# Patient Record
Sex: FEMALE | Race: WHITE | NOT HISPANIC OR LATINO | URBAN - NONMETROPOLITAN AREA
[De-identification: names, ages, dates, MRNs, and addresses within clinical notes are randomized per-mention and may not be internally consistent; named-entity substitution may affect disease eponyms.]

---

## 2021-01-20 ENCOUNTER — IMMUNIZATIONS (OUTPATIENT)
Dept: FAMILY MEDICINE CLINIC | Facility: HOSPITAL | Age: 41
End: 2021-01-20

## 2021-01-20 DIAGNOSIS — Z23 ENCOUNTER FOR IMMUNIZATION: Primary | ICD-10-CM

## 2021-01-20 PROCEDURE — 0011A SARS-COV-2 / COVID-19 MRNA VACCINE (MODERNA) 100 MCG: CPT

## 2021-01-20 PROCEDURE — 91301 SARS-COV-2 / COVID-19 MRNA VACCINE (MODERNA) 100 MCG: CPT

## 2021-02-26 ENCOUNTER — IMMUNIZATIONS (OUTPATIENT)
Dept: FAMILY MEDICINE CLINIC | Facility: HOSPITAL | Age: 41
End: 2021-02-26

## 2021-02-26 DIAGNOSIS — Z23 ENCOUNTER FOR IMMUNIZATION: Primary | ICD-10-CM

## 2021-02-26 PROCEDURE — 91301 SARS-COV-2 / COVID-19 MRNA VACCINE (MODERNA) 100 MCG: CPT

## 2021-02-26 PROCEDURE — 0012A SARS-COV-2 / COVID-19 MRNA VACCINE (MODERNA) 100 MCG: CPT

## 2022-03-22 ENCOUNTER — TELEPHONE (OUTPATIENT)
Dept: OBGYN CLINIC | Facility: CLINIC | Age: 42
End: 2022-03-22

## 2022-03-22 NOTE — TELEPHONE ENCOUNTER
Left vm stating that we are out of network with [address] until 4/15/22 so her appointment is cx'd for 3/25/22 and she can call back to r/s after 4/15

## 2024-05-19 ENCOUNTER — APPOINTMENT (OUTPATIENT)
Dept: RADIOLOGY | Facility: CLINIC | Age: 44
End: 2024-05-19
Payer: COMMERCIAL

## 2024-05-19 ENCOUNTER — OFFICE VISIT (OUTPATIENT)
Dept: URGENT CARE | Facility: CLINIC | Age: 44
End: 2024-05-19
Payer: COMMERCIAL

## 2024-05-19 VITALS
OXYGEN SATURATION: 99 % | HEART RATE: 72 BPM | DIASTOLIC BLOOD PRESSURE: 78 MMHG | SYSTOLIC BLOOD PRESSURE: 132 MMHG | RESPIRATION RATE: 18 BRPM

## 2024-05-19 DIAGNOSIS — M79.631 PAIN OF RIGHT FOREARM: Primary | ICD-10-CM

## 2024-05-19 PROCEDURE — 99213 OFFICE O/P EST LOW 20 MIN: CPT

## 2024-05-19 PROCEDURE — 73090 X-RAY EXAM OF FOREARM: CPT

## 2024-05-19 NOTE — PROGRESS NOTES
[facility]        NAME: Lauren Lopez is a 43 y.o. female  : 1980    MRN: [id number]  DATE: May 19, 2024  TIME: 1:43 PM    Assessment and Plan   Pain of right forearm [M79.631]  1. Pain of right forearm  XR forearm 2 vw right    Ambulatory Referral to Orthopedic Surgery        Discussed problem with patient.  Symptoms consistent with sprain and forearm x-rays reveal no acute abnormalities but awaiting official radiology interpretation.  Placed referral for orthopedic for follow-up as symptoms have been ongoing.  Discussed dosing of Tylenol and she also be using ice.    Patient Instructions       Follow up with PCP in 3-5 days.  Proceed to  ER if symptoms worsen.    If tests are performed, our office will contact you with results only if changes need to made to the care plan discussed with you at the visit. [facility] on [facility]    Chief Complaint     Chief Complaint   Patient presents with   • Arm Pain     Patient has right arm pain that started about 3 weeks ago after her dog knocked her down the stairs. Denies head trauma and any LOC. Believes her arm got stuck in the banister on the way down, has increased pain and weakness of the right arm          History of Present Illness       Patient has right arm pain that started about 3 weeks ago after her dog knocked her down the stairs. Denies head trauma and any LOC. Believes her arm got stuck in the banister on the way down, has increased pain and weakness of the right arm. 3/10 at rest. Tylenol two pills once per day. Denies any numbness or tingling    Arm Pain   Pertinent negatives include no chest pain.       Review of Systems   Review of Systems   Constitutional:  Negative for appetite change, chills, fatigue and fever.   Respiratory:  Negative for cough, shortness of breath, wheezing and stridor.    Cardiovascular:  Negative for chest pain and palpitations.         Current Medications       Current  Outpatient Medications:   •  Melatonin 5 MG CAPS, Take 5 mg by mouth daily, Disp: , Rfl:     Current Allergies     Allergies as of 05/19/2024 - Reviewed 05/19/2024   Allergen Reaction Noted   • Sulfa antibiotics Hives 05/19/2024            The following portions of the patient's history were reviewed and updated as appropriate: allergies, current medications, past family history, past medical history, past social history, past surgical history and problem list.     No past medical history on file.    No past surgical history on file.    No family history on file.      Medications have been verified.        Objective   /78   Pulse 72   Resp 18   SpO2 99%        Physical Exam     Physical Exam  Vitals and nursing note reviewed.   Constitutional:       General: She is not in acute distress.     Appearance: Normal appearance. She is normal weight. She is not ill-appearing, toxic-appearing or diaphoretic.   Cardiovascular:      Rate and Rhythm: Normal rate and regular rhythm.      Pulses: Normal pulses.      Heart sounds: Normal heart sounds. No murmur heard.     No friction rub. No gallop.   Pulmonary:      Effort: Pulmonary effort is normal. No respiratory distress.      Breath sounds: Normal breath sounds. No stridor. No wheezing, rhonchi or rales.   Chest:      Chest wall: No tenderness.   Musculoskeletal:      Right elbow: Normal.      Right forearm: No swelling, edema, deformity, lacerations, tenderness or bony tenderness.      Right wrist: Normal.      Comments: Full range of motion with elbow but with pain with elbow flexion and extension.  Is having anterior forearm pain that extends from her elbow down into her forearm.  No overlying skin changes or deformities.  No bruising or swelling.  +2 radial pulse as well as +2 brachial pulse.  Denies any numbness or tingling in extremity.  5 out of 5  strength   Neurological:      Mental Status: She is alert.

## 2024-07-10 ENCOUNTER — OFFICE VISIT (OUTPATIENT)
Dept: OBGYN CLINIC | Facility: CLINIC | Age: 44
End: 2024-07-10
Payer: COMMERCIAL

## 2024-07-10 VITALS
SYSTOLIC BLOOD PRESSURE: 118 MMHG | HEIGHT: 61 IN | BODY MASS INDEX: 32.1 KG/M2 | WEIGHT: 170 LBS | OXYGEN SATURATION: 98 % | DIASTOLIC BLOOD PRESSURE: 78 MMHG | HEART RATE: 60 BPM | TEMPERATURE: 97.4 F

## 2024-07-10 DIAGNOSIS — G89.29 CHRONIC PAIN OF RIGHT UPPER EXTREMITY: Primary | ICD-10-CM

## 2024-07-10 DIAGNOSIS — M79.601 CHRONIC PAIN OF RIGHT UPPER EXTREMITY: Primary | ICD-10-CM

## 2024-07-10 PROCEDURE — 99204 OFFICE O/P NEW MOD 45 MIN: CPT | Performed by: ORTHOPAEDIC SURGERY

## 2024-07-10 NOTE — PROGRESS NOTES
ASSESSMENT/PLAN:    Diagnoses and all orders for this visit:    Chronic pain of right upper extremity  -     Ambulatory Referral to Physical Therapy; Future        Plan: I would recommend a trial of physical therapy and a prescription was placed in her chart.  I will see her in 3 to 4 weeks for reevaluation.  The office should be contacted if questions or concerns arise.  She did question me as to whether or not she can return to working out in the gym and I think she would be able to do so but should avoid any exercises that only aggravate her symptoms.  If symptoms or not improving with physical therapy, additional diagnostic imaging would be warranted.    Return for 3 to 4 weeks.      _____________________________________________________  CHIEF COMPLAINT:  Chief Complaint   Patient presents with    Right Arm - Pain         SUBJECTIVE:  Lauren Lopez is a 43 y.o. year old right-handed female who presents for evaluation of her right forearm and elbow.  She states she fell down some stairs at home approximately 3 months ago trapping her arm in the banister and causing a traction, possible twisting, injury.  She did not seek immediate medical care but with persistent symptoms was seen at the [facility] on 5/19/2024.  X-rays were obtained.  She was recommended to seek orthopedic follow-up but delayed follow-up hoping that symptoms would spontaneously resolved.  She continues to complain of a deep aching pain located in the proximal, radial aspect of the right forearm radiating distally into her thumb and occasionally toward the small finger of her right hand.  She denies any paresthesias.  She denies neck pain.  She denies any history of symptoms prior to this injury.  She does not believe the symptoms have improved at all.  In fact, she believes the symptoms are worse now than they were in the first week or so after the injury.  Other than over-the-counter analgesics, she has had no specific  "treatment.    PAST MEDICAL HISTORY:  Past Medical History:   Diagnosis Date    Hypertension        PAST SURGICAL HISTORY:  History reviewed. No pertinent surgical history.    FAMILY HISTORY:  Family History   Problem Relation Age of Onset    Diabetes Maternal Grandfather             Cancer Maternal Grandmother             Cancer Maternal Aunt                SOCIAL HISTORY:  Social History     Tobacco Use    Smoking status: Never    Smokeless tobacco: Never   Vaping Use    Vaping status: Never Used   Substance Use Topics    Alcohol use: Yes     Comment: Socially, maybe once a month    Drug use: Never       MEDICATIONS:    Current Outpatient Medications:     Melatonin 5 MG CAPS, Take 5 mg by mouth daily, Disp: , Rfl:     ALLERGIES:  Allergies   Allergen Reactions    Sulfa Antibiotics Hives and Rash     Other reaction(s): Urticaria       Review of systems:   Constitutional: Negative for fatigue, fever or loss of apetite.   HENT: Negative.    Respiratory: Negative for shortness of breath, dyspnea.    Cardiovascular: Negative for chest pain/tightness.   Gastrointestinal: Negative for abdominal pain, N/V.   Endocrine: Negative for cold/heat intolerance, unexplained weight loss/gain.   Genitourinary: Negative for flank pain, dysuria, hematuria.   Musculoskeletal: Positive as in the HPI  Skin: Negative for rash.    Neurological: Negative  Psychiatric/Behavioral: Negative for agitation.  _____________________________________________________  PHYSICAL EXAMINATION:    Blood pressure 118/78, pulse 60, temperature (!) 97.4 °F (36.3 °C), temperature source Temporal, height 5' 1\" (1.549 m), weight 77.1 kg (170 lb), SpO2 98%.    General: well developed and well nourished, alert, oriented times 3, and appears comfortable  Psychiatric: Normal  HEENT: Benign  Cardiovascular: Regular    Pulmonary: No wheezing or stridor  Abdomen: Soft, Nontender  Skin: No masses, erythema, lacerations, fluctation, " ulcerations  Neurovascular: Motor and sensory exams are intact and pulses are palpable.    MUSCULOSKELETAL EXAMINATION:    The right upper extremity exam demonstrates no gross deformity to visual inspection.  There is no swelling, ecchymosis or abrasions noted.  She has excellent elbow and forearm range of motion but did complain of dorsal, proximal, radial forearm pain with supination.  She denies any pain with pronation.  She denies any pain with wrist dorsiflexion or volar flexion.  She has full finger and thumb range of motion without complaint.  She has excellent strength of resisted elbow flexion and extension without complaints.  She did complain of some dorsal, radial, proximal forearm pain with resisted pronation but denied pain with resisted supination.  She denies pain with resisted dorsiflexion or volar flexion of the wrist.  She had no localized tenderness over the lateral epicondyle, medial epicondyle, radial head/neck or olecranon.  She did have tenderness over the proximal forearm musculature 3 to 5 cm distal to the lateral epicondyle and extending throughout the dorsal forearm towards the junction of the diaphysis and metaphysis.  There is no tenderness over the carpus and no tenderness throughout the hand.  The ulnar shaft is nontender.  The radial shaft along the radial aspect is nontender.  The right shoulder exam is benign.  The remainder of the upper extremity examination bilaterally is benign.      _____________________________________________________  STUDIES REVIEWED:  X-rays of the forearm dated 5/19/2024 demonstrate no abnormality.    The x-ray report was reviewed.    The [facility] note was reviewed.    PROCEDURES:  Procedures      Rafita Gilbert

## 2024-07-23 ENCOUNTER — EVALUATION (OUTPATIENT)
Dept: PHYSICAL THERAPY | Facility: CLINIC | Age: 44
End: 2024-07-23
Payer: COMMERCIAL

## 2024-07-23 DIAGNOSIS — M79.601 CHRONIC PAIN OF RIGHT UPPER EXTREMITY: ICD-10-CM

## 2024-07-23 DIAGNOSIS — G89.29 CHRONIC PAIN OF RIGHT UPPER EXTREMITY: ICD-10-CM

## 2024-07-23 PROCEDURE — 97140 MANUAL THERAPY 1/> REGIONS: CPT | Performed by: PHYSICAL THERAPIST

## 2024-07-23 PROCEDURE — 97161 PT EVAL LOW COMPLEX 20 MIN: CPT | Performed by: PHYSICAL THERAPIST

## 2024-07-23 NOTE — PROGRESS NOTES
PT Evaluation     Today's date: 2024  Patient name: Lauren Lopez  : 1980  MRN: [id number]  Referring provider: Rafita Gilbert DO  Dx:   Encounter Diagnosis     ICD-10-CM    1. Chronic pain of right upper extremity  M79.601 Ambulatory Referral to Physical Therapy    G89.29                      Assessment  Impairments: activity intolerance, lacks appropriate home exercise program and pain with function  Symptom irritability: moderate    Assessment details: Patient is a 43 y.o. female who presents to PT with a recent history of lateral elbow pain following a traction type injury during a slip and fall on her stairs where she grabbed the banister as she was falling. Symptoms area primarily at the lateral elbow however tenderness is noted at the joint with mild swelling noted. Pain with bicep stretch is noted however she is able to achieve full elbow extension. Patient was instructed with initial HEP.   Understanding of Dx/Px/POC: excellent     Prognosis: excellent    Goals  2 weeks  Decreased pain to 3/10 at worst in the right arm  Patient able to demonstrate increased  strength by 5 lbs on the right  Patient to be independent with initial HEP.    6 week  Patient to demonstrate full painfree AROM of the right elbow  Patient able to complete OH reach and grab motions without increased elbow pain complaints.  Patient to return to full activity including attendance to [place] class and gym activity.     Plan  Patient would benefit from: PT eval and skilled physical therapy  Planned modality interventions: cryotherapy and TENS    Planned therapy interventions: manual therapy, neuromuscular re-education, therapeutic activities, therapeutic exercise and home exercise program    Frequency: 2x week  Duration in weeks: 6  Plan of Care beginning date: 2024  Plan of Care expiration date: 2024  Treatment plan discussed with: patient  Plan details: Patient's evaluation is completed. Patient was  instructed with a HEP this date. Patient has scheduled further PT sessions for treatment.          Subjective Evaluation    History of Present Illness  Mechanism of injury: Patient notes that she fell down her stairs in April when her dog knocked her over. She notes that over time pain increased. In May she did go for evaluation. She notes that she is an . She is having difficulty with opening jars. She has pain with attending [place] classes 1-2 x week. She feels like her hand is swollen. She is unable to sleep on the right side.   Patient Goals  Patient goals for therapy: decreased pain    Pain  Current pain ratin  At best pain ratin  At worst pain ratin  Location: forwarm into tthumb and sometime to the 5th digit  Quality: burning  Relieving factors: ice, heat and medications (Tylenol)  Progression: worsening    Social Support    Employment status: working (Full-time)  Hand dominance: right      Diagnostic Tests  X-ray: normal      Objective     Palpation     Right   Hypertonic in the wrist extensors.   Tenderness of the biceps, brachialis, brachioradialis, supinator, triceps and wrist extensors.     Active Range of Motion   Cervical/Thoracic Spine       Cervical    Flexion:  WFL  Extension:  WFL  Left lateral flexion:  WFL  Right lateral flexion:  WFL  Left rotation:  WFL  Right rotation:  WFL    Right Shoulder   Normal active range of motion    Right Elbow   Flexion: 130 degrees   Extension: 0 degrees     Right Wrist   Wrist flexion: 68 degrees   Wrist extension: 70 degrees   Radial deviation: 42 degrees   Ulnar deviation: 36 degrees     Additional Active Range of Motion Details  Left 46/ 62  Right 41/ 46    Strength/Myotome Testing     Left Shoulder   Normal muscle strength    Right Elbow   Forearm supination: 4 (pain)    Tests     Right Elbow   Positive Cozen's.              Precautions: none     Daily Treatment Diary:      Initial Evaluation Date: 24  Compliance               "        Visit Number 1                    Re-Eval  IE                 MC   Foto Captured Y                           7/23                     Manual                      IASTM - lateral elbow 8 min                                                                 Ther-Ex                      Wrist flexor and extensor stretch 20\" x3 ea                                           Bicep stretch -->                     Tricep stretch -->                                           AROM- wrist flex/ ext -->                     AROM - forearm sup/ pronation -->                     Digi  -->                                                                 Neuro Re-Ed                                                                                                Ther-Act                                                               Modalities                      CP 10 min                                                             "

## 2024-07-26 ENCOUNTER — OFFICE VISIT (OUTPATIENT)
Dept: PHYSICAL THERAPY | Facility: CLINIC | Age: 44
End: 2024-07-26
Payer: COMMERCIAL

## 2024-07-26 DIAGNOSIS — M79.601 CHRONIC PAIN OF RIGHT UPPER EXTREMITY: Primary | ICD-10-CM

## 2024-07-26 DIAGNOSIS — G89.29 CHRONIC PAIN OF RIGHT UPPER EXTREMITY: Primary | ICD-10-CM

## 2024-07-26 PROCEDURE — 97140 MANUAL THERAPY 1/> REGIONS: CPT | Performed by: PHYSICAL THERAPIST

## 2024-07-26 PROCEDURE — 97110 THERAPEUTIC EXERCISES: CPT | Performed by: PHYSICAL THERAPIST

## 2024-07-26 NOTE — PROGRESS NOTES
"Daily Note     Today's date: 2024  Patient name: Lauren Lopez  : 1980  MRN: [id number]  Referring provider: Rafita Gilbert DO  Dx:   Encounter Diagnosis     ICD-10-CM    1. Chronic pain of right upper extremity  M79.601     G89.29                      Subjective: Patient notes that she feels somewhat improved with her first PT session and use of HEP.       Objective: See treatment diary below      Assessment: Tolerated treatment well. Patient would benefit from continued PT. Patient is working to improve flexibility and return to PLOF. Tenderness persists in the forearm with activity. Able to progress to AROM with eccentric movement.       Plan: Continue per plan of care.      Precautions: none     Daily Treatment Diary:      Initial Evaluation Date: 24  Compliance                    Visit Number 1 2                   Re-Eval  IE -                MC   Foto Captured Y -                                             Manual                      IASTM - lateral elbow 8 min  8 min                                                               Ther-Ex                      Wrist flexor and extensor stretch 20\" x3 ea  20\" x4                                         Bicep stretch -->  20\" x4                   Tricep stretch -->  20\"x4                                         AROM- wrist flex/ ext -->  x20                   AROM - forearm sup/ pronation -->  x20                   Digi  -->  yellow x20                                                               Neuro Re-Ed                                                                                                Ther-Act                                                               Modalities                      CP 10 min HP 10 min                                                            "

## 2024-07-30 ENCOUNTER — OFFICE VISIT (OUTPATIENT)
Dept: PHYSICAL THERAPY | Facility: CLINIC | Age: 44
End: 2024-07-30
Payer: COMMERCIAL

## 2024-07-30 DIAGNOSIS — G89.29 CHRONIC PAIN OF RIGHT UPPER EXTREMITY: Primary | ICD-10-CM

## 2024-07-30 DIAGNOSIS — M79.601 CHRONIC PAIN OF RIGHT UPPER EXTREMITY: Primary | ICD-10-CM

## 2024-07-30 PROCEDURE — 97140 MANUAL THERAPY 1/> REGIONS: CPT

## 2024-07-30 PROCEDURE — 97110 THERAPEUTIC EXERCISES: CPT

## 2024-07-30 NOTE — PROGRESS NOTES
"Daily Note     Today's date: 2024  Patient name: Lauren Lopez  : 1980  MRN: [id number]  Referring provider: Rafita Gilbert DO  Dx:   Encounter Diagnosis     ICD-10-CM    1. Chronic pain of right upper extremity  M79.601     G89.29                      Subjective: Patient reports R elbow and forearm are sore today. She notes this is likely from increased computer/keyboard use at work today.      Objective: See treatment diary below      Assessment: Tolerated treatment well without complaint other than fatigue. She did not feel a stretch with bicep stretch today at EOT, but did note mild stretch with active elbow extension. She notes mild discomfort in thumb and palm following [employer] work. DeQ stretch added with moderate relief noted. Patient would benefit from continued PT to increase R elbow/wrist strength and mobility for improved function and activity tolerance.       Plan: Continue per plan of care.      Precautions: none     Daily Treatment Diary:      Initial Evaluation Date: 24  Compliance                  Visit Number 1 2  3                 Re-Eval  SEJAL -                MC   Foto Captured Y -                                           Manual                      IASTM - lateral elbow 8 min  8 min 8 min                                                            Ther-Ex                      Wrist flexor and extensor stretch 20\" x3 ea  20\" x4 20\"x4                                       Bicep stretch -->  20\" x4 Np- no stretch felt                 Tricep stretch -->  20\"x4 20\"x4                 DeQ St     20\"x4                 AROM- wrist flex/ ext -->  x20 x20 1#               AROM - forearm sup/ pronation -->  x20 x20 1#               Elbow flex/ext   x20          Digi  -->  yellow x20 Yellow 20x                 Therabar    Red N/U 15x ea                                       Neuro Re-Ed                                                                  "                               Ther-Act                                                               Modalities                      CP 10 min HP 10 min def

## 2024-08-02 ENCOUNTER — OFFICE VISIT (OUTPATIENT)
Dept: PHYSICAL THERAPY | Facility: CLINIC | Age: 44
End: 2024-08-02
Payer: COMMERCIAL

## 2024-08-02 DIAGNOSIS — G89.29 CHRONIC PAIN OF RIGHT UPPER EXTREMITY: Primary | ICD-10-CM

## 2024-08-02 DIAGNOSIS — M79.601 CHRONIC PAIN OF RIGHT UPPER EXTREMITY: Primary | ICD-10-CM

## 2024-08-02 PROCEDURE — 97140 MANUAL THERAPY 1/> REGIONS: CPT

## 2024-08-02 PROCEDURE — 97110 THERAPEUTIC EXERCISES: CPT

## 2024-08-02 NOTE — PROGRESS NOTES
"Daily Note     Today's date: 2024  Patient name: Lauren Lopez  : 1980  MRN: [id number]  Referring provider: Rafita Gilbert DO  Dx:   Encounter Diagnosis     ICD-10-CM    1. Chronic pain of right upper extremity  M79.601     G89.29                      Subjective: Patient reports she was very sore for the evening and the following morning following her last PT session.       Objective: See treatment diary below      Assessment: Tolerated treatment well without complaint. Patient would benefit from continued PT to increase R elbow/wrist strength and mobility for improved function in daily activities.      Plan: Continue per plan of care.      Precautions: none     Daily Treatment Diary:      Initial Evaluation Date: 24  Compliance                Visit Number 1 2  3 4               Re-Eval  SEJAL -                MC   Foto Captured Y -                                         Manual                      IASTM - lateral elbow 8 min  8 min 8 min 8 min                                                          Ther-Ex                      Wrist flexor and extensor stretch 20\" x3 ea  20\" x4 20\"x4 30\"x4                                     Bicep stretch -->  20\" x4 Np- no stretch felt                 Tricep stretch -->  20\"x4 20\"x4 20\"x4               DeQ St     20\"x4                 AROM- wrist flex/ ext -->  x20 x20 x20               AROM - forearm sup/ pronation -->  x20 x20 x20               Elbow flex/ext   x20 x20         Digi  -->  yellow x20 Yellow 20x Yellow 20x               Therabar    Red N/U 15x ea Red N/U 15x ea                                                               Neuro Re-Ed                                                                                                Ther-Act                                                               Modalities                      CP 10 min HP 10 min def def                                              "

## 2024-08-05 RX ORDER — BUSPIRONE HYDROCHLORIDE 5 MG/1
5 TABLET ORAL EVERY MORNING
COMMUNITY
Start: 2024-07-10

## 2024-08-05 RX ORDER — LISINOPRIL 20 MG/1
20 TABLET ORAL EVERY MORNING
COMMUNITY
Start: 2024-07-10

## 2024-08-06 ENCOUNTER — APPOINTMENT (OUTPATIENT)
Dept: PHYSICAL THERAPY | Facility: CLINIC | Age: 44
End: 2024-08-06
Payer: COMMERCIAL

## 2024-08-09 ENCOUNTER — OFFICE VISIT (OUTPATIENT)
Dept: PHYSICAL THERAPY | Facility: CLINIC | Age: 44
End: 2024-08-09
Payer: COMMERCIAL

## 2024-08-09 DIAGNOSIS — M79.601 CHRONIC PAIN OF RIGHT UPPER EXTREMITY: Primary | ICD-10-CM

## 2024-08-09 DIAGNOSIS — G89.29 CHRONIC PAIN OF RIGHT UPPER EXTREMITY: Primary | ICD-10-CM

## 2024-08-09 PROCEDURE — 97110 THERAPEUTIC EXERCISES: CPT

## 2024-08-09 PROCEDURE — 97140 MANUAL THERAPY 1/> REGIONS: CPT

## 2024-08-09 NOTE — PROGRESS NOTES
"Daily Note     Today's date: 2024  Patient name: Lauren Lopez  : 1980  MRN: [id number]  Referring provider: Rafita Gilbert DO  Dx:   Encounter Diagnosis     ICD-10-CM    1. Chronic pain of right upper extremity  M79.601     G89.29                      Subjective: Pt reports 3/10 pain coming into clinic today. She report she did try to fling an envelop like a frisbee and had an increase of pain with this yesterday. She does report some intermittent radiation into her palm and into the lateral arm.        Objective: See treatment diary below      Assessment: Tolerated treatment well. Patient exhibited good technique with therapeutic exercises but did report soreness following her care today.  Area of restriction and pain in the extensor tendons 1-2 inches distal to the lateral epicondyle and radial head in her forearm today.  She declined CP in the clinic today but was discussed her applying CP at work to reduce soreness.  Pt will continue to benefit from skilled PT intervention to assist in managing her R elbow and UE complaints. She did miss her DO follow up this week and is calling to reschedule in the upcoming days.      Plan: Continue per plan of care.      Precautions: none     Daily Treatment Diary:      Initial Evaluation Date: 24  Compliance              Visit Number 1 2  3 4  5             Re-Tamaraal  IE -                MC   Foto Captured Y -                                       Manual                      IASTM - lateral elbow 8 min  8 min 8 min 8 min  8 min                                                        Ther-Ex                      Wrist flexor and extensor stretch 20\" x3 ea  20\" x4 20\"x4 30\"x4  30\"x4                                   Bicep stretch -->  20\" x4 Np- no stretch felt    -             Tricep stretch -->  20\"x4 20\"x4 20\"x4  20x4\"             DeQ St     20\"x4                 AROM- wrist flex/ ext -->  x20 x20 x20  " x20             AROM - forearm sup/ pronation -->  x20 x20 x20  x20             Elbow flex/ext   x20 x20 x20        Digi  -->  yellow x20 Yellow 20x Yellow 20x  Yellow 20x             Therabar    Red N/U 15x ea Red N/U 15x ea  Red N/U 15x ea                                                             Neuro Re-Ed                                                                                                Ther-Act                                                               Modalities                      CP 10 min HP 10 min def def def

## 2024-08-13 ENCOUNTER — OFFICE VISIT (OUTPATIENT)
Dept: PHYSICAL THERAPY | Facility: CLINIC | Age: 44
End: 2024-08-13
Payer: COMMERCIAL

## 2024-08-13 DIAGNOSIS — M79.601 CHRONIC PAIN OF RIGHT UPPER EXTREMITY: Primary | ICD-10-CM

## 2024-08-13 DIAGNOSIS — G89.29 CHRONIC PAIN OF RIGHT UPPER EXTREMITY: Primary | ICD-10-CM

## 2024-08-13 PROCEDURE — 97110 THERAPEUTIC EXERCISES: CPT

## 2024-08-13 PROCEDURE — 97140 MANUAL THERAPY 1/> REGIONS: CPT

## 2024-08-13 NOTE — PROGRESS NOTES
"Daily Note     Today's date: 2024  Patient name: Lauren Lopez  : 1980  MRN: [id number]  Referring provider: Rafita Gilbert DO  Dx:   Encounter Diagnosis     ICD-10-CM    1. Chronic pain of right upper extremity  M79.601     G89.29                      Subjective: Patient notes quite a bit of R elbow soreness lately- she feels like this is because she has been very busy and active in addition to performing HEP.      Objective: See treatment diary below      Assessment: Tolerated treatment well without complaint. Patient would benefit from continued PT to increase R elbow strength and mobility for improved function in daily activities.      Plan: Continue per plan of care.      Precautions: none     Daily Treatment Diary:      Initial Evaluation Date: 24  Compliance            Visit Number 1 2  3 4  5  6           Re-Eval  IE -                MC   Foto Captured Y -        Y                             Manual                      IASTM - lateral elbow 8 min  8 min 8 min 8 min  8 min 8 min                                                      Ther-Ex                      Wrist flexor and extensor stretch 20\" x3 ea  20\" x4 20\"x4 30\"x4  30\"x4 30\"x4                                 Bicep stretch -->  20\" x4 Np- no stretch felt    -             Tricep stretch -->  20\"x4 20\"x4 20\"x4  20x4\" 30\"x4           DeQ St     20\"x4                 AROM- wrist flex/ ext -->  x20 x20 x20  x20 1# x20           AROM - forearm sup/ pronation -->  x20 x20 x20  x20 1# x20           Elbow flex/ext   x20 x20 x20 1# 20x       Digi  -->  yellow x20 Yellow 20x Yellow 20x  Yellow 20x  Red 1 min           Therabar    Red N/U 15x ea Red N/U 15x ea  Red N/U 15x ea  Red N/U/ twist 15x ea                                                           Neuro Re-Ed                                                                                                Ther-Act       "                                                         Modalities                      CP 10 min HP 10 min def def def CP 10 min post

## 2024-08-14 ENCOUNTER — APPOINTMENT (OUTPATIENT)
Dept: PHYSICAL THERAPY | Facility: CLINIC | Age: 44
End: 2024-08-14
Payer: COMMERCIAL

## 2024-08-20 ENCOUNTER — OFFICE VISIT (OUTPATIENT)
Dept: PHYSICAL THERAPY | Facility: CLINIC | Age: 44
End: 2024-08-20
Payer: COMMERCIAL

## 2024-08-20 DIAGNOSIS — G89.29 CHRONIC PAIN OF RIGHT UPPER EXTREMITY: Primary | ICD-10-CM

## 2024-08-20 DIAGNOSIS — M79.601 CHRONIC PAIN OF RIGHT UPPER EXTREMITY: Primary | ICD-10-CM

## 2024-08-20 PROCEDURE — 97140 MANUAL THERAPY 1/> REGIONS: CPT

## 2024-08-20 PROCEDURE — 97110 THERAPEUTIC EXERCISES: CPT

## 2024-08-20 NOTE — PROGRESS NOTES
"Daily Note     Today's date: 2024  Patient name: Lauren Lopez  : 1980  MRN: [id number]  Referring provider: Rafita Gilbert DO  Dx:   Encounter Diagnosis     ICD-10-CM    1. Chronic pain of right upper extremity  M79.601     G89.29                      Subjective: Patient reports symptoms vary from day to day with no noticeable pattern. She notes R hand continues to feel \"swollen.\"      Objective: See treatment diary below      Assessment: Tolerated treatment well without complaint. She did demonstrate moderate fatigue post session. Patient would benefit from continued PT to increase R elbow/forearm strength and mobility for improved function in ADLs.      Plan: Continue per plan of care.      Precautions: none     Daily Treatment Diary:      Initial Evaluation Date: 24  Compliance          Visit Number 1 2  3 4  5  6 7         Re-Eval  IE -                MC   Foto Captured Y -        Y                           Manual                      IASTM - lateral elbow 8 min  8 min 8 min 8 min  8 min 8 min And stm 10 min                                                    Ther-Ex                      Wrist flexor and extensor stretch 20\" x3 ea  20\" x4 20\"x4 30\"x4  30\"x4 30\"x4  4x30\"                               Bicep stretch -->  20\" x4 Np- no stretch felt    -             Tricep stretch -->  20\"x4 20\"x4 20\"x4  20x4\" 30\"x4           DeQ St     20\"x4                 AROM- wrist flex/ ext -->  x20 x20 x20  x20 1# x20  1# 20x all 4 dir         AROM - forearm sup/ pronation -->  x20 x20 x20  x20 1# x20  1# 20x         Elbow flex/ext   x20 x20 x20 1# 20x 1# 20x      Digi  -->  yellow x20 Yellow 20x Yellow 20x  Yellow 20x  Red 1 min Red 1 min         Therabar    Red N/U 15x ea Red N/U 15x ea  Red N/U 15x ea  Red N/U/ twist 15x ea  Red N/U/ twist 15x ea         theraweb       Red pinch, pinch and twist      Supination hold stretch  " "      3# 30\"x3                            Neuro Re-Ed                                                                                                Ther-Act                                                               Modalities                      CP 10 min HP 10 min def def def CP 10 min post Def to home                                                                 "

## 2024-08-21 ENCOUNTER — OFFICE VISIT (OUTPATIENT)
Dept: OBGYN CLINIC | Facility: CLINIC | Age: 44
End: 2024-08-21
Payer: COMMERCIAL

## 2024-08-21 ENCOUNTER — APPOINTMENT (OUTPATIENT)
Dept: PHYSICAL THERAPY | Facility: CLINIC | Age: 44
End: 2024-08-21
Payer: COMMERCIAL

## 2024-08-21 VITALS
SYSTOLIC BLOOD PRESSURE: 136 MMHG | OXYGEN SATURATION: 100 % | BODY MASS INDEX: 32.1 KG/M2 | HEART RATE: 71 BPM | HEIGHT: 61 IN | WEIGHT: 170 LBS | DIASTOLIC BLOOD PRESSURE: 78 MMHG | TEMPERATURE: 97.8 F

## 2024-08-21 DIAGNOSIS — M25.521 PAIN IN RIGHT ELBOW: ICD-10-CM

## 2024-08-21 DIAGNOSIS — G89.29 CHRONIC PAIN OF RIGHT UPPER EXTREMITY: Primary | ICD-10-CM

## 2024-08-21 DIAGNOSIS — M79.601 CHRONIC PAIN OF RIGHT UPPER EXTREMITY: Primary | ICD-10-CM

## 2024-08-21 PROCEDURE — 99213 OFFICE O/P EST LOW 20 MIN: CPT | Performed by: ORTHOPAEDIC SURGERY

## 2024-08-21 NOTE — PATIENT INSTRUCTIONS
Due to the patient's continued pain in the elbow with extension and slightly decreased sensation and weakness with  strength testing that has not improved with physical therapy we will order an MRI of the right elbow and see the patient back with those results.  Work note not needed per patient.  Avoid painful activities.

## 2024-08-21 NOTE — PROGRESS NOTES
ASSESSMENT/PLAN:    Diagnoses and all orders for this visit:    Chronic pain of right upper extremity  -     MRI elbow right wo contrast; Future    Pain in right elbow    Due to the patient's continued pain in the elbow with extension and slightly decreased sensation and weakness with  strength testing that has not improved with physical therapy we will order an MRI of the right elbow and see the patient back with those results.  Work note not needed per patient.  Avoid painful activities.    Return for Recheck when MRI results available.  _____________________________________________________  CHIEF COMPLAINT:  Chief Complaint   Patient presents with    Follow-up     SUBJECTIVE:  Lauren Lopez is a 43 y.o. year old female who presents for follow up of right arm pain after being seen by orthopedics 7/10/2024 and sent for physical therapy.  Initial injury occurred [employer] 4 months ago after prolonged persistent symptoms, she was seen in the care now 2024 with negative x-rays.  Patient has been to physical therapy.  She feels she is slightly better which she feels is about 10% improved.  She notes that she continues to have sensation of her hands being swollen with current .  She feels as though the pain is primarily in the elbow and exes accentuated with extension of the elbow.  Notes the pain is sometimes in a slightly different location and feels that she is getting some pain that is radiating up the outside edge of her arm slightly.  She denies neck pain.  Denies gross numbness but occasional mild tingling of the fingers.    PAST MEDICAL HISTORY:  Past Medical History:   Diagnosis Date    Hypertension      PAST SURGICAL HISTORY:  History reviewed. No pertinent surgical history.    FAMILY HISTORY:  Family History   Problem Relation Age of Onset    Diabetes Maternal Grandfather             Cancer Maternal Grandmother             Cancer Maternal Aunt              SOCIAL  HISTORY:  Social History     Tobacco Use    Smoking status: Never    Smokeless tobacco: Never   Vaping Use    Vaping status: Never Used   Substance Use Topics    Alcohol use: Yes     Comment: Socially, maybe once a month    Drug use: Never     MEDICATIONS:    Current Outpatient Medications:     busPIRone (BUSPAR) 5 mg tablet, Take 5 mg by mouth every morning, Disp: , Rfl:     lisinopril (ZESTRIL) 20 mg tablet, Take 20 mg by mouth every morning, Disp: , Rfl:     Melatonin 5 MG CAPS, Take 5 mg by mouth daily, Disp: , Rfl:     ALLERGIES:  Allergies   Allergen Reactions    Sulfa Antibiotics Hives and Rash     Other reaction(s): Urticaria     REVIEW OF SYSTEMS:  Pertinent items are noted in HPI.  A comprehensive review of systems was negative.  _____________________________________________________  PHYSICAL EXAMINATION:  General: well developed and well nourished, alert, oriented times 3, and appears comfortable  Psychiatric: Normal  HEENT:  Normocephalic, atraumatic  Cardiovascular:  Regular  Pulmonary: No wheezing or stridor  Skin: No masses, erthema, lacerations, fluctation, ulcerations  Neurovascular: Light touch sensation notes decreased sensation on the right little finger long finger and other side of the forearm.  Deep tendon reflexes note 3/4 biceps which is symmetric bilaterally 2/4 triceps and brachial radialis bilaterally.    MUSCULOSKELETAL EXAMINATION:    Right arm is no ecchymosis or gross deformity.  She locates area discomfort over the lateral joint just medial to the olecranon process.  She has no discrete radial head tenderness, nor medial/lateral epicondylar tenderness.  She has no gross pain with resisted wrist flexion or extension.  Radial pulses intact.  Capillary refill is brisk.  She has full forward flexion but she notes with extension of the elbow that she does get some posterior elbow pain.  Strength testing notes 4+  on the right and 5/5 strength on the left.  Full pronation and  supination.    Cervical spine notes full flexion and extension as well as rotation and sidebending.  These cause no pain.  She has negative Spurling's maneuver.  _____________________________________________________  STUDIES REVIEWED:  No new studies to review.  PT notes reviewed.    PROCEDURES PERFORMED:  None today.    Molina Caceres PA-C

## 2024-08-26 ENCOUNTER — TELEPHONE (OUTPATIENT)
Age: 44
End: 2024-08-26

## 2024-08-26 NOTE — TELEPHONE ENCOUNTER
Caller: [facility]    Doctor: DALTON    Reason for call: called for auth number for MRI  Auth # [id number]     Call back#: N/A

## 2024-08-26 NOTE — TELEPHONE ENCOUNTER
Called and spoke with patient. Made them aware that MRI order was faxed to [facility] for them for scheduling.

## 2024-08-26 NOTE — TELEPHONE ENCOUNTER
Caller: Patient    Doctor: Dr. Gilbert    Reason for call: Patient calling stating that she received a phone call from her insurance and they will not cover MRI at [facility].  Patient asking if the order for the MRI to be faxed to number below.    Attn: [facility]   Fax: [phone]    Call back#: [phone]

## 2024-08-27 ENCOUNTER — OFFICE VISIT (OUTPATIENT)
Dept: PHYSICAL THERAPY | Facility: CLINIC | Age: 44
End: 2024-08-27
Payer: COMMERCIAL

## 2024-08-27 DIAGNOSIS — G89.29 CHRONIC PAIN OF RIGHT UPPER EXTREMITY: Primary | ICD-10-CM

## 2024-08-27 DIAGNOSIS — M79.601 CHRONIC PAIN OF RIGHT UPPER EXTREMITY: Primary | ICD-10-CM

## 2024-08-27 PROCEDURE — 97140 MANUAL THERAPY 1/> REGIONS: CPT | Performed by: PHYSICAL THERAPIST

## 2024-08-27 PROCEDURE — 97110 THERAPEUTIC EXERCISES: CPT | Performed by: PHYSICAL THERAPIST

## 2024-08-27 NOTE — PROGRESS NOTES
"Daily Note     Today's date: 2024  Patient name: Lauren Lopez  : 1980  MRN: [id number]  Referring provider: Rafita Gilbert DO  Dx:   Encounter Diagnosis     ICD-10-CM    1. Chronic pain of right upper extremity  M79.601     G89.29                      Subjective: Patient notes that pain does persist at a 4-5/10 this date but did have a painfree day on the weekend.       Objective: See treatment diary below      Assessment: Tolerated treatment well. Patient would benefit from continued PT. Patient continues with limited right  strength. Able to advance to 2# PRE for the forearm with mild fatigue noted. Patient will have MRI and follow up with ortho next week for further evaluation.       Plan: Continue per plan of care.      Precautions: none     Daily Treatment Diary:      Initial Evaluation Date: 24  Compliance        Visit Number 1 2  3 4  5  6 7  8       Re-Eval   -                   Foto Captured Y -        Y                         Manual                      IASTM - lateral elbow 8 min  8 min 8 min 8 min  8 min 8 min And stm 10 min  STM 10 min                                                  Ther-Ex                      Wrist flexor and extensor stretch 20\" x3 ea  20\" x4 20\"x4 30\"x4  30\"x4 30\"x4  4x30\"  4x30\"                             Bicep stretch -->  20\" x4 Np- no stretch felt    -             Tricep stretch -->  20\"x4 20\"x4 20\"x4  20x4\" 30\"x4    d/c       DeQ St     20\"x4                 AROM- wrist flex/ ext -->  x20 x20 x20  x20 1# x20  1# 20x all 4 dir  2# 20x 4 dir       AROM - forearm sup/ pronation -->  x20 x20 x20  x20 1# x20  1# 20x  2# 20x       Elbow flex/ext   x20 x20 x20 1# 20x 1# 20x 2# 20x     Digi  -->  yellow x20 Yellow 20x Yellow 20x  Yellow 20x  Red 1 min Red 1 min  Red 1 min/ green x10 reps       Therabar    Red N/U 15x ea Red N/U 15x ea  Red N/U 15x ea  Red N/U/ " "twist 15x ea  Red N/U/ twist 15x ea  Red N/U/twisr 15x ea       theraweb       Red pinch, pinch and twist Red- pin/ / pro/sup x10 ea     Supination hold stretch        3# 30\"x3 3# 20\" x4                           Neuro Re-Ed                                                                                                Ther-Act                                                               Modalities                      CP 10 min HP 10 min def def def CP 10 min post Def to home Def to home                                                                  "

## 2024-08-28 ENCOUNTER — OFFICE VISIT (OUTPATIENT)
Dept: PHYSICAL THERAPY | Facility: CLINIC | Age: 44
End: 2024-08-28
Payer: COMMERCIAL

## 2024-08-28 DIAGNOSIS — M79.601 CHRONIC PAIN OF RIGHT UPPER EXTREMITY: Primary | ICD-10-CM

## 2024-08-28 DIAGNOSIS — G89.29 CHRONIC PAIN OF RIGHT UPPER EXTREMITY: Primary | ICD-10-CM

## 2024-08-28 PROCEDURE — 97110 THERAPEUTIC EXERCISES: CPT

## 2024-08-28 PROCEDURE — 97140 MANUAL THERAPY 1/> REGIONS: CPT

## 2024-08-28 NOTE — PROGRESS NOTES
"Daily Note     Today's date: 2024  Patient name: Lauren Lopez  : 1980  MRN: [id number]  Referring provider: Rafita Gilbert DO  Dx:   Encounter Diagnosis     ICD-10-CM    1. Chronic pain of right upper extremity  M79.601     G89.29                      Subjective: Patient reports she woke up ay 3am and had to take ibuprofen because R forearm was very sore.       Objective: See treatment diary below      Assessment: Tolerated treatment well without complaint other than fatigue. Patient would benefit from continued PT to increase R forearm/wrist mobility and  strength for improved function in daily activities.      Plan: Continue per plan of care.  Patient to be placed on hold and will reach out following review of MRI.     Precautions: none     Daily Treatment Diary:      Initial Evaluation Date: 24  Compliance      Visit Number 1 2  3 4  5  6 7  8 9     Re-Eval   -                MC   Foto Captured Y -        Y                       Manual                      IASTM - lateral elbow 8 min  8 min 8 min 8 min  8 min 8 min And stm 10 min  STM 10 min STM 10 min                                                Ther-Ex                      Wrist flexor and extensor stretch 20\" x3 ea  20\" x4 20\"x4 30\"x4  30\"x4 30\"x4  4x30\"  4x30\"  4x30\"                           Bicep stretch -->  20\" x4 Np- no stretch felt    -             Tricep stretch -->  20\"x4 20\"x4 20\"x4  20x4\" 30\"x4    d/c       DeQ St     20\"x4                 AROM- wrist flex/ ext -->  x20 x20 x20  x20 1# x20  1# 20x all 4 dir  2# 20x 4 dir 2# 20x 4 dir     AROM - forearm sup/ pronation -->  x20 x20 x20  x20 1# x20  1# 20x  2# 20x 2# 20x     Elbow flex/ext   x20 x20 x20 1# 20x 1# 20x 2# 20x 2# 20x    Digi  -->  yellow x20 Yellow 20x Yellow 20x  Yellow 20x  Red 1 min Red 1 min  Red 1 min/ green x10 reps Red 1 min     Therabar    Red N/U 15x " "ea Red N/U 15x ea  Red N/U 15x ea  Red N/U/ twist 15x ea  Red N/U/ twist 15x ea  Red N/U/twisr 15x ea  Red N/U/twisr 15x ea     theraweb       Red pinch, pinch and twist Red- pin/ / pro/sup x10 ea Red- pin/ / pro/sup x10 ea    Supination hold stretch        3# 30\"x3 3# 20\" x4 3# 20\" x4                          Neuro Re-Ed                                                                                                Ther-Act                                                               Modalities                      CP 10 min HP 10 min def def def CP 10 min post Def to home Def to home                                                                    "

## 2024-09-04 ENCOUNTER — TELEPHONE (OUTPATIENT)
Dept: OBGYN CLINIC | Facility: CLINIC | Age: 44
End: 2024-09-04

## 2024-09-04 NOTE — TELEPHONE ENCOUNTER
Called and left voice message to confirm where they got their MRI imaging completed. Had faxed order to [facility] for patient prior. If patient ddi get it done with them, asked for them to bring disc and report with them to appointment.     [phone]

## 2024-09-09 ENCOUNTER — OFFICE VISIT (OUTPATIENT)
Dept: OBGYN CLINIC | Facility: CLINIC | Age: 44
End: 2024-09-09
Payer: COMMERCIAL

## 2024-09-09 VITALS
OXYGEN SATURATION: 99 % | WEIGHT: 182.6 LBS | HEART RATE: 65 BPM | DIASTOLIC BLOOD PRESSURE: 80 MMHG | BODY MASS INDEX: 34.48 KG/M2 | SYSTOLIC BLOOD PRESSURE: 132 MMHG | HEIGHT: 61 IN | TEMPERATURE: 98 F

## 2024-09-09 DIAGNOSIS — G89.29 CHRONIC PAIN OF RIGHT UPPER EXTREMITY: Primary | ICD-10-CM

## 2024-09-09 DIAGNOSIS — M25.521 PAIN IN RIGHT ELBOW: ICD-10-CM

## 2024-09-09 DIAGNOSIS — M79.601 CHRONIC PAIN OF RIGHT UPPER EXTREMITY: Primary | ICD-10-CM

## 2024-09-09 PROCEDURE — 99213 OFFICE O/P EST LOW 20 MIN: CPT | Performed by: ORTHOPAEDIC SURGERY

## 2024-09-09 NOTE — PROGRESS NOTES
ASSESSMENT/PLAN:    Diagnoses and all orders for this visit:    Chronic pain of right upper extremity    Pain in right elbow        Plan: I would recommend evaluation by Dr. Urena.  I see nothing of specific significance on the MRI other than this potential ganglion cyst although, in my opinion, this appears to be more anterior in location.  It does not appear to be in the area of her primary complaint, with pain more posterolateral.  She is to contact the office if questions or concerns arise.    Return for Follow-up with Dr. Urena.      _____________________________________________________  CHIEF COMPLAINT:  Chief Complaint   Patient presents with    Right Elbow - Follow-up     Follow up right elbow         SUBJECTIVE:  Lauren Lopez is a 43 y.o. year old female who presents for follow up of her right elbow symptoms.  Since she was last seen she does not believe symptoms have improved, may be worsening.  MRI has been completed and she returns now to discuss the findings.  She continues to complain of pain primarily in the posterolateral aspect of her elbow.  She does note some radiation distally in the dorsal radial forearm.  She denies instability.  She denies paresthesias.      PAST MEDICAL HISTORY:  Past Medical History:   Diagnosis Date    Hypertension        PAST SURGICAL HISTORY:  History reviewed. No pertinent surgical history.    FAMILY HISTORY:  Family History   Problem Relation Age of Onset    Diabetes Maternal Grandfather             Cancer Maternal Grandmother             Cancer Maternal Aunt                SOCIAL HISTORY:  Social History     Tobacco Use    Smoking status: Never    Smokeless tobacco: Never   Vaping Use    Vaping status: Never Used   Substance Use Topics    Alcohol use: Yes     Comment: Socially, maybe once a month    Drug use: Never       MEDICATIONS:    Current Outpatient Medications:     Melatonin 5 MG CAPS, Take 5 mg by mouth daily, Disp: , Rfl:      busPIRone (BUSPAR) 5 mg tablet, Take 5 mg by mouth every morning (Patient not taking: Reported on 9/9/2024), Disp: , Rfl:     lisinopril (ZESTRIL) 20 mg tablet, Take 20 mg by mouth every morning (Patient not taking: Reported on 9/9/2024), Disp: , Rfl:     ALLERGIES:  Allergies   Allergen Reactions    Sulfa Antibiotics Hives and Rash     Other reaction(s): Urticaria       REVIEW OF SYSTEMS:  Pertinent items are noted in HPI.  A comprehensive review of systems was negative.      _____________________________________________________  PHYSICAL EXAMINATION:  General: well developed and well nourished, alert, and appears comfortable  Psychiatric: Normal  HEENT:  Normocephalic, atraumatic  Cardiovascular:  Regular  Pulmonary: No wheezing or stridor  Skin: No masses, erthema, lacerations, fluctation, ulcerations  Neurovascular: Gross motor and sensory exams are intact and pulses are palpable.    MUSCULOSKELETAL EXAMINATION:    Right elbow exam demonstrates no gross deformity to visual inspection.  She has excellent elbow and forearm range of motion complaining of posterolateral discomfort with active and passive flexion.  She has posterolateral pain during supination and pronation of the forearm.  The epicondyles were nontender to palpation.  She does have tenderness just posterior and distal to the lateral epicondyle.  The medial epicondyle and ulnar nerve are nontender.  The extensor/supinator and flexor/pronator origins are nontender.  She has excellent strength against resisted range of motion.    _____________________________________________________  STUDIES REVIEWED:  The MRI was reviewed demonstrating a increased signal within the anterior soft tissues which appears to be consistent with a cystic like mass.  I did not see evidence of significant additional pathology.    The MRI report was reviewed.          Rafita Gilbert DO

## 2024-09-19 ENCOUNTER — OFFICE VISIT (OUTPATIENT)
Dept: OBGYN CLINIC | Facility: CLINIC | Age: 44
End: 2024-09-19
Payer: COMMERCIAL

## 2024-09-19 VITALS
SYSTOLIC BLOOD PRESSURE: 138 MMHG | TEMPERATURE: 97.3 F | WEIGHT: 184.2 LBS | HEART RATE: 68 BPM | OXYGEN SATURATION: 100 % | BODY MASS INDEX: 34.78 KG/M2 | HEIGHT: 61 IN | DIASTOLIC BLOOD PRESSURE: 80 MMHG

## 2024-09-19 DIAGNOSIS — R22.31 FOREARM MASS, RIGHT: Primary | ICD-10-CM

## 2024-09-19 PROCEDURE — 99215 OFFICE O/P EST HI 40 MIN: CPT | Performed by: STUDENT IN AN ORGANIZED HEALTH CARE EDUCATION/TRAINING PROGRAM

## 2024-09-19 NOTE — PROGRESS NOTES
ASSESSMENT/PLAN:    Assessment:   Mass  right  forearm    Plan:   -As I explained to the patient, discussed the case with colleagues to confirm diagnosis and treatment plan.  At that time I will call the patient to follow-up with treatment plan.    Addendum:   -I spoke with [facility]'s [facility] radiologist Dr. Herrera who agreed the mass in the volar radial proximal forearm is likely vascular vs nervous in origin. He recommended a follow up MRI with and without contrast to discern between the two.   -I then call Lauren (patient) and discussed this with her. She confirmed her understanding that the MRI with contrast will be of value in confirming the diagnosis. The order has been placed. It is an external referral as she must obtain it from an outside office due to insurance. She will follow up with results.     Follow Up:  The patient will follow-up after my phone call with - discussed    To Do Next Visit:   Create surgical plan    General Discussions:  This patient has a mass in the volar radial proximal forearm that is now symptomatic.  There are 2 main issues present.  1 issue is that given the patient is symptomatic she is hindered in her ability to perform daily functional activities.  As the patient stated she is limited by her ability to perform her job at work as she becomes painful near the end of the day.  Additionally she is no longer able to exercise and has had to stop attending the physical exercise classes that she has been doing for several years now.  This demonstrates a significant functional limitation caused by the pain that I believe is associated with the mass.  This is because on physical exam her symptoms are directly created by palpating the mass which I can easily palpate on examination.  That tells me that the mass is the source of the pain she is having.  In regards to the mass itself although she is not sure if it has grown in size it is large enough to palpate.  The mass on MRI appears to be  superficial to the fascial tissue which is a good sign.  Based on MRI I would say the mass appears to be involving either a cutaneous sensory nerve and in this case I believe it might be the LABC, or the mass could be a dilation of the vessel in the area.  Given the mass is superficial and masses of the nerve and vessels are rarely malignant, I think this patient's chance of malignancy is low, however the mass is large and therefore somewhat concerning.  Additionally the patient is quite symptomatic from it as described above and therefore I think would be justified in requesting surgical excision of the mass.  I will confer with colleagues on the imaging to confirm my suspected diagnosis and then will discuss with patient potential treatment options for which the patient can decide. - I spoke with Dr. Herrera ([facility] radiologist) who recommended an MRI with contrast. Patient aware.     Operative Discussions:  We did discuss the potential of surgery today.  I noted that if the nerve is involved there is a chance that she could require a resection of the portion of the nerve and depending on the nerve function would likely have a deficit therein.  She expressed understanding to this.  I will have a further discussion in the future when I have confirmed the treatment pathway.    _____________________________________________________  CHIEF COMPLAINT:  Chief Complaint   Patient presents with    Right Elbow - Follow-up     Right elbow fu       SUBJECTIVE:  Lauren Lopez is a 43 y.o. female who presents with Pain  Moderate  Constant  Sharp, Electric, and Burning to the right elbow.  This started  3 month(s) ago as Due to a personal injury.    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 She was treated in Urgent Care in May and received an xray. She was previously treated by Dr. Gilbert who ordered 6 weeks of physical therapy and an MRI. Today, she complains of decreased  strength and swelling.  She has not noted a change in the size of the mass although does feel that she is becoming more symptomatic.  She feels the pain is greatly limiting her.  She used to attend regular exercise classes at a local gym and has had to stop going because of the pain.  She has pain with lifting objects flexing her elbow and really any motion of the right arm.  She is also tender to palpation over that area.  She has had no fevers no chills and no overlying skin changes and no evidence of infection recently.  She has had no signs of weight loss, no night sweats, no other masses.  She has no history of cancer.  The mass has not been interrupting her sleep however she did note that she has awoken a few times when she bumps her arm due to the pain.  She denies specific numbness or tingling in the hand but feels some strange sensory sensations that run along the radial forearm into the base of the thumb.    PAST MEDICAL HISTORY:  Past Medical History:   Diagnosis Date    Hypertension        PAST SURGICAL HISTORY:  History reviewed. No pertinent surgical history.    FAMILY HISTORY:  Family History   Problem Relation Age of Onset    Diabetes Maternal Grandfather             Cancer Maternal Grandmother             Cancer Maternal Aunt                SOCIAL HISTORY:  Social History     Tobacco Use    Smoking status: Never    Smokeless tobacco: Never   Vaping Use    Vaping status: Never Used   Substance Use Topics    Alcohol use: Yes     Comment: Socially, maybe once a month    Drug use: Never       MEDICATIONS:    Current Outpatient Medications:     Melatonin 5 MG CAPS, Take 5 mg by mouth daily, Disp: , Rfl:     busPIRone (BUSPAR) 5 mg tablet, Take 5 mg by mouth every morning  "(Patient not taking: Reported on 9/9/2024), Disp: , Rfl:     lisinopril (ZESTRIL) 20 mg tablet, Take 20 mg by mouth every morning (Patient not taking: Reported on 9/9/2024), Disp: , Rfl:     ALLERGIES:  Allergies   Allergen Reactions    Sulfa Antibiotics Hives and Rash     Other reaction(s): Urticaria       REVIEW OF SYSTEMS:  Pertinent items are noted in HPI.  A comprehensive review of systems was negative.    LABS:  HgA1c: No results found for: \"HGBA1C\"  BMP:   Lab Results   Component Value Date    CALCIUM 9.6 06/30/2023    K 4.0 06/30/2023    CO2 24 06/30/2023     06/30/2023    BUN 11 06/30/2023    CREATININE 0.9 06/30/2023         _____________________________________________________  PHYSICAL EXAMINATION:  Vital signs: /80 (BP Location: Left arm, Patient Position: Sitting, Cuff Size: Standard)   Pulse 68   Temp (!) 97.3 °F (36.3 °C)   Ht 5' 1\" (1.549 m)   Wt 83.6 kg (184 lb 3.2 oz)   SpO2 100%   BMI 34.80 kg/m²   General: well developed and well nourished, alert, oriented times 3, and appears comfortable  Psychiatric: Normal  HEENT: Trachea Midline, No torticollis  Cardiovascular: No discernable arrhythmia  Pulmonary: No wheezing or stridor  Abdomen: No rebound or guarding  Extremities: No peripheral edema  Skin: No masses, erythema, lacerations, fluctation, ulcerations  Neurovascular: Sensation Intact to the Median, Ulnar, Radial Nerve, Motor Intact to the Median, Ulnar, Radial Nerve, and Pulses Intact    MUSCULOSKELETAL EXAMINATION:  RIGHT SIDE:  Elbow:  No anatomical deformity  Skin is warm and dry to touch with no signs of erythema, ecchymosis, or infection   There is a palpable structure in the volar radial forearm.  When this is palpated the patient has pain and says that this is the same pain she has been having.  This area is not warm and there is no overlying erythema and therefore no evidence of infection in this area.  When I palpate this cord and then percussive and a tinnel like " manner there is pain directly at the palpation site however there is no electric shooting sensation proximally or distally.  And palpation of the remainder of the elbow and forearm there is no tenderness over the medial or lateral epicondyle, no tenderness at the triceps insertion, or the biceps insertion.  There is no tenderness over the olecranon or the radial head.  The elbow itself is stable to valgus and varus stress and with pivot shift testing there is no evidence of posterior lateral rotatory instability.  The sensation throughout the forearm is intact including over the distribution of the LABC.  Demonstrates normal shoulder, wrist, and finger motion  2+ distal radial pulse with brisk capillary refill to the fingers  Radial, median, and ulnar motor and sensory distrubution intact  Sensation to light touch intact distally     _____________________________________________________  STUDIES REVIEWED:  MRI of right elbow obtained on 9/3/2024 were reviewed and demonstrate  my evaluation of the bony architecture of the elbow and the intrinsic ligamentous attachments is unremarkable.  I cannot appreciate any increased inflammation at the medial lateral epicondyles or within the tendinous structures.  However there does appear to be a mass in the volar aspect of the arm just volar to the radius that approximately is centered over the radial head.  This mass is just superficial to the muscular fascia and is therefore just deep to the superficial adipose tissue.  The mass itself appears longitudinal and spindle like running the length of the forearm and is about 5.6 cm in length with a maximal dilation of 1 cm.  The mass appears to follow the track of a cordlike structure that could be a nerve or vessel and is likely therefore an extension of the structure .    Coding Justification: I spent over 55 minutes evaluating the patient, reviewing her imaging, Discussing the depth the differential for disease, discussing the  case with a hand surgery colleague, discussing the case with an [facility] radiologist and having them review the imaging, and then calling the patient to discuss the findings and suggest the follow up MRI.

## 2024-09-20 ENCOUNTER — TELEPHONE (OUTPATIENT)
Age: 44
End: 2024-09-20

## 2024-09-20 NOTE — TELEPHONE ENCOUNTER
Caller: Patient     Doctor: Romel     Reason for call: Patient received the order for her MRI and asked for it to be an MRI with contrast   Please call patient once completed   Call back#: [phone]

## 2024-09-24 NOTE — TELEPHONE ENCOUNTER
Caller: Amy/[facility]    Doctor: Romel    Reason for call: sent in basket message-Received a call from Amy hernández/[employer]. This patient has an appointment 9/25 6pm for a MRI. [employer] requires a Prior Auth. [facility] phn#[phone]. Fax#[phone]    Call back#: [phone]

## 2024-09-25 NOTE — TELEPHONE ENCOUNTER
Caller: Patient    Doctor: Romel    Reason for call: Patient is calling to check on prior auth status MRI ELBOW RIGHT W WO CONTRAST. Patient is schedule for MRI today at 5pm     [employer] sent to prior auth team    Call back#: [phone]

## 2024-10-04 ENCOUNTER — OFFICE VISIT (OUTPATIENT)
Dept: OBGYN CLINIC | Facility: CLINIC | Age: 44
End: 2024-10-04
Payer: COMMERCIAL

## 2024-10-04 VITALS
DIASTOLIC BLOOD PRESSURE: 52 MMHG | OXYGEN SATURATION: 98 % | TEMPERATURE: 97.7 F | HEIGHT: 61 IN | SYSTOLIC BLOOD PRESSURE: 132 MMHG | WEIGHT: 184.6 LBS | HEART RATE: 65 BPM | BODY MASS INDEX: 34.85 KG/M2

## 2024-10-04 DIAGNOSIS — R22.31 FOREARM MASS, RIGHT: Primary | ICD-10-CM

## 2024-10-04 PROCEDURE — 99214 OFFICE O/P EST MOD 30 MIN: CPT | Performed by: STUDENT IN AN ORGANIZED HEALTH CARE EDUCATION/TRAINING PROGRAM

## 2024-10-04 RX ORDER — IBUPROFEN 200 MG
TABLET ORAL EVERY 6 HOURS PRN
COMMUNITY

## 2024-10-04 NOTE — PROGRESS NOTES
Orthopedic Surgery Management Note  Lauren Lopez (43 y.o. female)  : 1980 Encounter Date: 10/4/2024    Assessment/Plan:  43 y.o. female presenting for follow-up of a right forearm mass.  At her last visit we reviewed her MRI which demonstrated a mass in the right proximal radial forearm.  The MRI was without contrast and after discussing the case with a [facility] radiologist we referred her for a contrast MRI, from which she returns today.  I carefully went over this imaging with Dr. Castellano our lead [facility] radiologist who indicated that the volar forearm mass which enhances is likely a dilation of the cephalic vein such as a varix.  He stated that the small enhancing mass in the cubital tunnel by the ulnar nerve is still likely a cyst as synovial fluid can enhance with postgadolinium imaging if given enough time.  I called the patient to discuss these findings.  As she is still having pain over this site specifically I am going to refer her to vascular for potential evaluation.  It is possible that this pain that she is having is radial tunnel syndrome however in light of the abnormal imaging finding I would like to rule out the vein dilation as a possible source.  I discussed with the patient that after she is evaluated by vascular if they feel this is noncontributory I will further investigate the possibility of radial tunnel syndrome and the patient.    Follow-up with vascular  Return if she is still having symptoms      Subjective:  43 y.o. female presenting to the office for for her second visit for evaluation of right radial proximal forearm pain.  As we discussed on her prior imaging there is a mass in her radial forearm which I discussed appeared vascular versus nerve in origin.  She continues to have the same pain in her proximal radial forearm.  I have performed a workup based off of recommendations by our [facility] radiologist to evaluate this mass, which has included a MRI with contrast.  She returns today   to go over this.  Her symptoms have not changed since her prior visit      Review of Systems  Review of systems negative unless otherwise specified in HPI    Past Medical History  Past Medical History:   Diagnosis Date    Hypertension      Past Surgical History  History reviewed. No pertinent surgical history.  Current Medications  Current Outpatient Medications on File Prior to Visit   Medication Sig Dispense Refill    ibuprofen (MOTRIN) 200 mg tablet Take by mouth every 6 (six) hours as needed for mild pain      Melatonin 5 MG CAPS Take 5 mg by mouth daily       No current facility-administered medications on file prior to visit.       Physical exam  Exam: Right arm  Skin is intact and there is no obvious skin change, erythema, rashes, or discoloration she is still point tender  Of the proximal radial forearm just anterior to the proximal radial head.  There does feel to be a linear mass in this area which she says is tender with palpation.  Tinel percussion of this mass does not result in any shooting electric pains.  Overall she is grossly intact to motor and sensation in the radial, ulnar, and median nerve distributions over the cubital tunnel  She does not have any numbness or tingling in the ulnar nerve distribution with Tinel over the ulnar nerve.  She is negative for flexion compression testing of the ulnar nerve.  There is no pain with palpation over the cubital tunnel.  Brisk Capillary Refill    Imaging:  Study type: MRI right elbow with and without contrast  Date: 9/25/2024  MRI of the right elbow with and without contrast was performed.  I went over this imaging with Dr. Michael Castellano.  On enhancement the mass in the proximal radial forearm does enhance and appears to be a dilation of the cephalic vein in this area.  Additionally there is a 6 mm enhancing round mass in the cubital tunnel next to the ulnar nerve which does appear to be likely an outpouching of the ulnohumeral joint.    Scribe Attestation       I,:  Darling Balderas am acting as a scribe while in the presence of the attending physician.:       I,:  Damon Urena MD personally performed the services described in this documentation    as scribed in my presence.:

## 2024-11-04 ENCOUNTER — TELEPHONE (OUTPATIENT)
Dept: VASCULAR SURGERY | Facility: HOSPITAL | Age: 44
End: 2024-11-04

## 2024-11-13 ENCOUNTER — APPOINTMENT (OUTPATIENT)
Dept: RADIOLOGY | Facility: CLINIC | Age: 44
End: 2024-11-13
Payer: COMMERCIAL

## 2024-11-13 ENCOUNTER — OFFICE VISIT (OUTPATIENT)
Dept: URGENT CARE | Facility: CLINIC | Age: 44
End: 2024-11-13
Payer: COMMERCIAL

## 2024-11-13 VITALS
SYSTOLIC BLOOD PRESSURE: 118 MMHG | OXYGEN SATURATION: 98 % | HEIGHT: 62 IN | DIASTOLIC BLOOD PRESSURE: 88 MMHG | RESPIRATION RATE: 19 BRPM | HEART RATE: 112 BPM | TEMPERATURE: 98.2 F | BODY MASS INDEX: 33.13 KG/M2 | WEIGHT: 180 LBS

## 2024-11-13 DIAGNOSIS — J40 BRONCHITIS: Primary | ICD-10-CM

## 2024-11-13 DIAGNOSIS — R05.1 ACUTE COUGH: ICD-10-CM

## 2024-11-13 PROCEDURE — 99213 OFFICE O/P EST LOW 20 MIN: CPT

## 2024-11-13 PROCEDURE — 71046 X-RAY EXAM CHEST 2 VIEWS: CPT

## 2024-11-13 RX ORDER — PREDNISONE 20 MG/1
40 TABLET ORAL DAILY
Qty: 10 TABLET | Refills: 0 | Status: SHIPPED | OUTPATIENT
Start: 2024-11-13 | End: 2024-11-18

## 2024-11-13 RX ORDER — AZITHROMYCIN 250 MG/1
TABLET, FILM COATED ORAL
Qty: 6 TABLET | Refills: 0 | Status: SHIPPED | OUTPATIENT
Start: 2024-11-13 | End: 2024-11-17

## 2024-11-13 RX ORDER — BENZONATATE 100 MG/1
100 CAPSULE ORAL 3 TIMES DAILY PRN
Qty: 20 CAPSULE | Refills: 0 | Status: SHIPPED | OUTPATIENT
Start: 2024-11-13 | End: 2024-11-20

## 2024-11-13 NOTE — PROGRESS NOTES
"  [facility]        NAME: Lauren Lopez is a 43 y.o. female  : 1980    MRN: [id number]  DATE: 2024  TIME: 10:49 AM    Assessment and Plan   Bronchitis [J40]  1. Bronchitis  XR chest pa and lateral    azithromycin (ZITHROMAX) 250 mg tablet    predniSONE 20 mg tablet    benzonatate (TESSALON PERLES) 100 mg capsule        Lungs clear on PE and VSS. Preliminary chest XR read negative, final read pending. Given symptom duration x5-6 days, will treat with Azithromycin, oral steroids, and Tessalon Perles PRN. Encouraged continued supportive measures.  Follow up with PCP in 3-5 days or proceed to emergency department for worsening symptoms.  Patient verbalized understanding of instructions given.       Patient Instructions     Patient Instructions     Preliminary chest XR read negative, final read pending  Take antibiotic as prescribed  Take oral steroids as prescribed  Continue with supportive measures, OTC Tylenol/Ibuprofen, nasal decongestants, and cough suppressants (Tessalon Perles)   Cool mist humidifiers, throat lozenges, increased fluid intake and rest   Follow up with PCP in 3-5 days  Present to ER if symptoms worsen       If tests have been performed at [facility], our office will contact you with results if changes need to be made to the care plan discussed with you at the visit.  You can review your full results on [facility] MyChart.      Patient Education     Acute bronchitis in adults   The Basics   Written by the doctors and editors at [facility]   What is bronchitis? -- This is an infection that causes a cough. It happens when the tubes that carry air into the lungs, called the \"bronchi,\" get infected (figure 1).  Usually, bronchitis happens after a person gets a cold or the flu. The viruses that cause the cold or flu infect the bronchi and irritate them. Antibiotics do not help bronchitis.  Bronchitis can also happen when a person gets an infection called \"whooping cough,\" " "but this is much less common. Whooping cough is caused by bacteria that can infect the bronchi. Most people get vaccines to prevent whooping cough, but the vaccine doesn't always work. Your doctor will be able to tell if you have whooping cough by doing an exam and listening to your cough.  This article is about \"acute\" bronchitis. This is different from \"chronic\" bronchitis, which is a lung disease that most often affects people who smoke.  What are the symptoms of bronchitis? -- The most common symptoms are:   A cough that can last up to a few weeks   Coughing up mucus that is clear, yellow, or green - Green mucus does not always mean that you have a bacterial infection.  You might also have other cold or flu symptoms, like a stuffy nose, sore throat, or headache. People with bronchitis do not usually get a fever.  Will I need tests? -- Most people with bronchitis do not need a test. But if your doctor or nurse is not sure what is causing your cough, they might do tests. For example, they might order a chest X-ray. Or if they think that you might have COVID-19, they will test you for the virus that causes the infection.  How is bronchitis treated? -- Bronchitis almost always goes away on its own. But the cough can take up to 3 weeks to get better, and sometimes even longer.  Doctors do not usually treat bronchitis with antibiotic medicines. That's because bronchitis is usually caused by a virus, and antibiotics kill bacteria, not viruses. Also, antibiotics can actually cause other problems.  To feel better, you can treat your cold and flu symptoms. You can:   Get lots of rest, and drink plenty of liquids.   Drink hot tea.   Suck on cough drops or hard candy.   Take over-the-counter cough and cold medicines.   Breath in warm, moist air, such as in the shower, over a kettle, or from a humidifier.   Take a pain-relieving medicine if you have cold or flu symptoms like headache, muscle aches, or joint pain.  Avoid " "smoking or being around others who smoke. This can make your cough worse.  How can I keep from getting bronchitis again? -- You can reduce your chance of getting bronchitis again by keeping the germs that cause bronchitis out of your body. One of the best ways to do this is to wash your hands often with soap and water. If there is no sink nearby, you can use a hand gel with alcohol in it to clean your hands.  How can I keep from spreading germs? -- In addition to washing your hands often, cover your mouth with your elbow when you sneeze or cough. Using your elbow keeps you from getting germs on your hands. If you use a tissue, throw the tissue away and wash your hands.  When should I call the doctor? -- Call for advice if you have:   A fever higher than 100.4°F (38°C), or chills   Chest pain when you cough, trouble breathing, or coughing up blood   A barking cough that makes it hard to talk   Cough and weight loss that you cannot explain   Symptoms that are not getting better after 3 weeks  All topics are updated as new evidence becomes available and our peer review process is complete.  This topic retrieved from [employer] on: May 16, 2024.  Topic 49458 Version 17.0  Release: 32.4.3 - C32.135  © 2024 UpToDate, Inc. and/or its affiliates. All rights reserved.  figure 1: Normal lungs     The lungs sit in the chest, inside the ribcage. They are covered with a thin membrane called the \"pleura.\" The windpipe, or trachea, branches into 2 smaller airways called the left and right \"bronchi.\" The space between the lungs is called the \"mediastinum.\" Lymph nodes are located within and around the lungs and mediastinum.  Graphic 09511 Version 14.0  Consumer Information Use and Disclaimer   Disclaimer: This generalized information is a limited summary of diagnosis, treatment, and/or medication information. It is not meant to be comprehensive and should be used as a tool to help the user understand and/or assess potential " diagnostic and treatment options. It does NOT include all information about conditions, treatments, medications, side effects, or risks that may apply to a specific patient. It is not intended to be medical advice or a substitute for the medical advice, diagnosis, or treatment of a health care provider based on the health care provider's examination and assessment of a patient's specific and unique circumstances. Patients must speak with a health care provider for complete information about their health, medical questions, and treatment options, including any risks or benefits regarding use of medications. This information does not endorse any treatments or medications as safe, effective, or approved for treating a specific patient. UpToDate, Inc. and its affiliates disclaim any warranty or liability relating to this information or the use thereof.The use of this information is governed by the Terms of Use, available at https://www.incir.com.[employer]/en/know/clinical-effectiveness-terms. 2024© UpToDate, Inc. and its affiliates and/or licensors. All rights reserved.  Copyright   © 2024 UpToDate, Inc. and/or its affiliates. All rights reserved.        Chief Complaint     Chief Complaint   Patient presents with    Cold Like Symptoms     Started 5 days ago  Coughing, sinus congestion, and bilateral ear fullness  OTC mucinex             History of Present Illness       43-year-old female with a past medical history significant for hypertension presents with complaints of subjective fever, chills, nasal congestion, and cough x 5 days.  Patient reports some wheezing when lying flat but denies shortness of breath.  No measurable temperature.  No vomiting or diarrhea.  States positive sick contact/exposure to individual with same symptoms. She has been taking OTC medications with some relief.         Review of Systems   Review of Systems   Constitutional:  Positive for chills and fever.   HENT:  Positive for congestion and  "rhinorrhea. Negative for ear discharge, ear pain, sore throat, trouble swallowing and voice change.    Eyes:  Negative for discharge.   Respiratory:  Positive for cough and wheezing. Negative for shortness of breath.    Cardiovascular:  Negative for chest pain.   Gastrointestinal:  Negative for abdominal pain, diarrhea, nausea and vomiting.   Skin:  Negative for rash.         Current Medications       Current Outpatient Medications:     azithromycin (ZITHROMAX) 250 mg tablet, Take 2 tablets today then 1 tablet daily x 4 days, Disp: 6 tablet, Rfl: 0    benzonatate (TESSALON PERLES) 100 mg capsule, Take 1 capsule (100 mg total) by mouth 3 (three) times a day as needed for cough for up to 7 days, Disp: 20 capsule, Rfl: 0    ibuprofen (MOTRIN) 200 mg tablet, Take by mouth every 6 (six) hours as needed for mild pain, Disp: , Rfl:     Melatonin 5 MG CAPS, Take 5 mg by mouth daily, Disp: , Rfl:     predniSONE 20 mg tablet, Take 2 tablets (40 mg total) by mouth daily for 5 days, Disp: 10 tablet, Rfl: 0    Current Allergies     Allergies as of 2024 - Reviewed 2024   Allergen Reaction Noted    Sulfa antibiotics Hives and Rash 2010            The following portions of the patient's history were reviewed and updated as appropriate: allergies, current medications, past family history, past medical history, past social history, past surgical history and problem list.     Past Medical History:   Diagnosis Date    Hypertension        History reviewed. No pertinent surgical history.    Family History   Problem Relation Age of Onset    Diabetes Maternal Grandfather             Cancer Maternal Grandmother             Cancer Maternal Aunt                  Medications have been verified.        Objective   /88   Pulse (!) 112   Temp 98.2 °F (36.8 °C)   Resp 19   Ht 5' 2\" (1.575 m)   Wt 81.6 kg (180 lb)   LMP 10/18/2024   SpO2 98%   BMI 32.92 kg/m²   Patient's last menstrual period " was 10/18/2024.       Physical Exam     Physical Exam  Vitals and nursing note reviewed.   Constitutional:       General: She is not in acute distress.     Appearance: She is not toxic-appearing.   HENT:      Head: Normocephalic.      Right Ear: Tympanic membrane, ear canal and external ear normal.      Left Ear: Tympanic membrane, ear canal and external ear normal.      Nose: Congestion present.      Mouth/Throat:      Mouth: Mucous membranes are moist.      Pharynx: Oropharynx is clear.   Eyes:      Conjunctiva/sclera: Conjunctivae normal.   Cardiovascular:      Rate and Rhythm: Normal rate and regular rhythm.      Heart sounds: Normal heart sounds.   Pulmonary:      Effort: Pulmonary effort is normal. No respiratory distress.      Breath sounds: Normal breath sounds. No stridor. No wheezing, rhonchi or rales.   Lymphadenopathy:      Cervical: No cervical adenopathy.   Skin:     General: Skin is warm and dry.   Neurological:      Mental Status: She is alert and oriented to person, place, and time.      Gait: Gait is intact.   Psychiatric:         Mood and Affect: Mood normal.         Behavior: Behavior normal.

## 2024-11-13 NOTE — PATIENT INSTRUCTIONS
"  Preliminary chest XR read negative, final read pending  Take antibiotic as prescribed  Take oral steroids as prescribed  Continue with supportive measures, OTC Tylenol/Ibuprofen, nasal decongestants, and cough suppressants (Tessalon Perles)   Cool mist humidifiers, throat lozenges, increased fluid intake and rest   Follow up with PCP in 3-5 days  Present to ER if symptoms worsen       If tests have been performed at [facility], our office will contact you with results if changes need to be made to the care plan discussed with you at the visit.  You can review your full results on [facility] MyChart.      Patient Education     Acute bronchitis in adults   The Basics   Written by the doctors and editors at [facility]   What is bronchitis? -- This is an infection that causes a cough. It happens when the tubes that carry air into the lungs, called the \"bronchi,\" get infected (figure 1).  Usually, bronchitis happens after a person gets a cold or the flu. The viruses that cause the cold or flu infect the bronchi and irritate them. Antibiotics do not help bronchitis.  Bronchitis can also happen when a person gets an infection called \"whooping cough,\" but this is much less common. Whooping cough is caused by bacteria that can infect the bronchi. Most people get vaccines to prevent whooping cough, but the vaccine doesn't always work. Your doctor will be able to tell if you have whooping cough by doing an exam and listening to your cough.  This article is about \"acute\" bronchitis. This is different from \"chronic\" bronchitis, which is a lung disease that most often affects people who smoke.  What are the symptoms of bronchitis? -- The most common symptoms are:   A cough that can last up to a few weeks   Coughing up mucus that is clear, yellow, or green - Green mucus does not always mean that you have a bacterial infection.  You might also have other cold or flu symptoms, like a stuffy nose, sore throat, or headache. People with " bronchitis do not usually get a fever.  Will I need tests? -- Most people with bronchitis do not need a test. But if your doctor or nurse is not sure what is causing your cough, they might do tests. For example, they might order a chest X-ray. Or if they think that you might have COVID-19, they will test you for the virus that causes the infection.  How is bronchitis treated? -- Bronchitis almost always goes away on its own. But the cough can take up to 3 weeks to get better, and sometimes even longer.  Doctors do not usually treat bronchitis with antibiotic medicines. That's because bronchitis is usually caused by a virus, and antibiotics kill bacteria, not viruses. Also, antibiotics can actually cause other problems.  To feel better, you can treat your cold and flu symptoms. You can:   Get lots of rest, and drink plenty of liquids.   Drink hot tea.   Suck on cough drops or hard candy.   Take over-the-counter cough and cold medicines.   Breath in warm, moist air, such as in the shower, over a kettle, or from a humidifier.   Take a pain-relieving medicine if you have cold or flu symptoms like headache, muscle aches, or joint pain.  Avoid smoking or being around others who smoke. This can make your cough worse.  How can I keep from getting bronchitis again? -- You can reduce your chance of getting bronchitis again by keeping the germs that cause bronchitis out of your body. One of the best ways to do this is to wash your hands often with soap and water. If there is no sink nearby, you can use a hand gel with alcohol in it to clean your hands.  How can I keep from spreading germs? -- In addition to washing your hands often, cover your mouth with your elbow when you sneeze or cough. Using your elbow keeps you from getting germs on your hands. If you use a tissue, throw the tissue away and wash your hands.  When should I call the doctor? -- Call for advice if you have:   A fever higher than 100.4°F (38°C), or chills    "Chest pain when you cough, trouble breathing, or coughing up blood   A barking cough that makes it hard to talk   Cough and weight loss that you cannot explain   Symptoms that are not getting better after 3 weeks  All topics are updated as new evidence becomes available and our peer review process is complete.  This topic retrieved from [employer] on: May 16, 2024.  Topic 59153 Version 17.0  Release: 32.4.3 - C32.135  © 2024 UpToDate, Inc. and/or its affiliates. All rights reserved.  figure 1: Normal lungs     The lungs sit in the chest, inside the ribcage. They are covered with a thin membrane called the \"pleura.\" The windpipe, or trachea, branches into 2 smaller airways called the left and right \"bronchi.\" The space between the lungs is called the \"mediastinum.\" Lymph nodes are located within and around the lungs and mediastinum.  Graphic 26746 Version 14.0  Consumer Information Use and Disclaimer   Disclaimer: This generalized information is a limited summary of diagnosis, treatment, and/or medication information. It is not meant to be comprehensive and should be used as a tool to help the user understand and/or assess potential diagnostic and treatment options. It does NOT include all information about conditions, treatments, medications, side effects, or risks that may apply to a specific patient. It is not intended to be medical advice or a substitute for the medical advice, diagnosis, or treatment of a health care provider based on the health care provider's examination and assessment of a patient's specific and unique circumstances. Patients must speak with a health care provider for complete information about their health, medical questions, and treatment options, including any risks or benefits regarding use of medications. This information does not endorse any treatments or medications as safe, effective, or approved for treating a specific patient. UpToDate, Inc. and its affiliates disclaim any warranty or " liability relating to this information or the use thereof.The use of this information is governed by the Terms of Use, available at https://www.[employer].com/en/know/clinical-effectiveness-terms. 2024© UpToDate, Inc. and its affiliates and/or licensors. All rights reserved.  Copyright   © 2024 [employer] and/or its affiliates. All rights reserved.

## 2024-11-13 NOTE — LETTER
November 13, 2024     Patient: Lauren Lopez   YOB: 1980   Date of Visit: 11/13/2024       To Whom it May Concern:    Lauren Lopez was seen in my clinic on 11/13/2024. She may return to work on 11/14/2024 .    If you have any questions or concerns, please don't hesitate to call.         Sincerely,          HUSSEIN Lockhart        CC: No Recipients

## 2024-12-13 ENCOUNTER — TELEPHONE (OUTPATIENT)
Dept: VASCULAR SURGERY | Facility: CLINIC | Age: 44
End: 2024-12-13

## 2024-12-13 NOTE — TELEPHONE ENCOUNTER
Lvm & sent rayna message that we need to r/s 12/17/24 ov w/ dr ortiz. Pt can be r/s to any vs next available

## 2025-02-10 ENCOUNTER — TELEPHONE (OUTPATIENT)
Dept: VASCULAR SURGERY | Facility: CLINIC | Age: 45
End: 2025-02-10

## 2025-02-10 NOTE — TELEPHONE ENCOUNTER
Lvm to move appt for 2/11 w/ diana in [place] to [place] w/ adrianna at 10 am, dr ortiz will not be available. Asked pt to call back and let us know if sreedhar silva do this if not please schedule next available w/ VS pt has not see a VS yet so can be anyone.

## 2025-03-14 ENCOUNTER — OFFICE VISIT (OUTPATIENT)
Dept: VASCULAR SURGERY | Facility: CLINIC | Age: 45
End: 2025-03-14
Payer: COMMERCIAL

## 2025-03-14 VITALS
DIASTOLIC BLOOD PRESSURE: 92 MMHG | WEIGHT: 190 LBS | SYSTOLIC BLOOD PRESSURE: 125 MMHG | HEIGHT: 62 IN | HEART RATE: 69 BPM | BODY MASS INDEX: 34.96 KG/M2 | OXYGEN SATURATION: 95 %

## 2025-03-14 DIAGNOSIS — R22.31 FOREARM MASS, RIGHT: Primary | ICD-10-CM

## 2025-03-14 DIAGNOSIS — Q27.9 VENOUS MALFORMATION: ICD-10-CM

## 2025-03-14 DIAGNOSIS — M25.521 PAIN IN RIGHT ELBOW: ICD-10-CM

## 2025-03-14 PROCEDURE — 99204 OFFICE O/P NEW MOD 45 MIN: CPT | Performed by: SURGERY

## 2025-03-14 NOTE — ASSESSMENT & PLAN NOTE
"Lauren is a pleasant 44-year-old woman who presents to the office referred by orthopedic surgery for right upper extremity pain and swelling after sustaining a fall.  Pain has been ongoing for several months and getting progressively worse.  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   MRI also notable for synovial cyst in the vicinity of nerve structure.  When asked to clarify her symptoms patient reports that she has developed progressive weakness of her right hand  as well as \"IcyHot\" sensation and occasional burning centered around her elbow upper arm and forearm.  I explained to Lauren that her constellation of symptoms are not consistent with venous pathology.  Venous aneurysms typically are concerning for development of thrombus/DVT and episodes of thrombophlebitis.  On MRI there is no evidence of surrounding inflammation.  On exam her right upper extremity appears normal with no evidence of erythema, palpable cord, tenderness to palpation, and/or warmth.  He denies any episodes of prior thrombophlebitis.  She does not recall whether she had a blood draw at the site in the past.  Overall I do not feel that her symptoms are consistent to the present venous dilation noted on MRI.  Unclear whether underlying etiology is related to nerve compression from reported synovial cyst?  We briefly discuss potential benefit of further workup including EMG/nerve conduction velocity testing.  From a vascular standpoint I will obtain a venous duplex to further interrogate the venous abnormality noted on MRI imaging.  We also discussed that if symptoms progress and underlying etiology remains uncertain I am happy to proceed with local excision of the venous dilation/aneurysm I made clear that this may not alleviate her current symptomatology.  I will call patient with results of her venous duplex when completed.  She understands that " should she not receive a call from me to please call the office to notify us that duplex was completed.

## 2025-03-14 NOTE — PROGRESS NOTES
"Name: Lauren Lopez      : 1980      MRN: [id number]  Encounter Provider: Alfred Abbott DO  Encounter Date: 3/14/2025   Encounter department: [facility]  :  Assessment & Plan  Forearm mass, right    Orders:    Ambulatory Referral to Vascular Surgery    VAS upper limb venous duplex scan, unilateral/limited; Future    Pain in right elbow         Venous malformation  Lauren is a pleasant 44-year-old woman who presents to the office referred by orthopedic surgery for right upper extremity pain and swelling after sustaining a fall.  Pain has been ongoing for several months and getting progressively worse.  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   MRI also notable for synovial cyst in the vicinity of nerve structure.  When asked to clarify her symptoms patient reports that she has developed progressive weakness of her right hand  as well as \"IcyHot\" sensation and occasional burning centered around her elbow upper arm and forearm.  I explained to Lauren that her constellation of symptoms are not consistent with venous pathology.  Venous aneurysms typically are concerning for development of thrombus/DVT and episodes of thrombophlebitis.  On MRI there is no evidence of surrounding inflammation.  On exam her right upper extremity appears normal with no evidence of erythema, palpable cord, tenderness to palpation, and/or warmth.  He denies any episodes of prior thrombophlebitis.  She does not recall whether she had a blood draw at the site in the past.  Overall I do not feel that her symptoms are consistent to the present venous dilation noted on MRI.  Unclear whether underlying etiology is related to nerve compression from reported synovial cyst?  We briefly discuss potential benefit of further workup including EMG/nerve conduction velocity testing.  From a vascular standpoint I will obtain " a venous duplex to further interrogate the venous abnormality noted on MRI imaging.  We also discussed that if symptoms progress and underlying etiology remains uncertain I am happy to proceed with local excision of the venous dilation/aneurysm I made clear that this may not alleviate her current symptomatology.  I will call patient with results of her venous duplex when completed.  She understands that should she not receive a call from me to please call the office to notify us that duplex was completed.                   History of Present Illness   HPI  Lauren Lopez is a 44 y.o. female who presents to the office referred by orthopedic surgery for superficial venous dilation noted on MRI of the upper extremity.    Pt is new to our practice. Pt is here to review MRI 24. 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 Pt c/o of right arm pain around her elbow and daily right hand swelling.   History obtained from: patient    Review of Systems  Past Medical History   Past Medical History:   Diagnosis Date    Hypertension      No past surgical history on file.  Family History   Problem Relation Age of Onset    Diabetes Maternal Grandfather             Cancer Maternal Grandmother             Cancer Maternal Aunt               reports that she has never smoked. She has never used smokeless tobacco. She reports current alcohol use. She reports that she does not use drugs.  Current Outpatient Medications   Medication Instructions    ibuprofen (MOTRIN) 200 mg tablet Every 6 hours PRN    Melatonin 5 mg, Daily     Allergies   Allergen Reactions    Sulfa Antibiotics Hives and Rash     Other reaction(s): Urticaria      Current Outpatient Medications on File Prior to Visit   Medication Sig Dispense Refill    ibuprofen (MOTRIN) 200 mg tablet Take by mouth every 6 (six) hours as needed for mild pain      Melatonin 5 MG CAPS Take 5 mg by mouth daily       No current  "facility-administered medications on file prior to visit.      Social History     Tobacco Use    Smoking status: Never    Smokeless tobacco: Never   Vaping Use    Vaping status: Never Used   Substance and Sexual Activity    Alcohol use: Yes     Comment: Socially, maybe once a month    Drug use: Never    Sexual activity: Yes     Partners: Male     Birth control/protection: None     Comment:  had a vasectomy        Objective   /92 (BP Location: Right arm, Patient Position: Sitting)   Pulse 69   Ht 5' 2\" (1.575 m)   Wt 86.2 kg (190 lb)   SpO2 95%   BMI 34.75 kg/m²      Physical Exam  Constitutional:       General: She is not in acute distress.     Appearance: She is well-developed.   HENT:      Head: Normocephalic and atraumatic.   Eyes:      General: No scleral icterus.     Conjunctiva/sclera: Conjunctivae normal.   Neck:      Trachea: No tracheal deviation.   Cardiovascular:      Rate and Rhythm: Normal rate.   Pulmonary:      Effort: Pulmonary effort is normal.   Abdominal:      General: There is no distension.      Palpations: Abdomen is soft. Mass: no appreciable aortic pulsation/aneurysm.   Musculoskeletal:         General: Normal range of motion.      Cervical back: Normal range of motion.      Comments: I do not prescient any venous dilation/aneurysm on visual inspection nor on palpation of the right upper extremity.  Lateral upper extremities appear symmetrical.  Patient with an easily palpable right radial artery pulse.   Skin:     General: Skin is warm and dry.      Findings: No bruising, erythema or lesion.   Neurological:      Mental Status: She is alert and oriented to person, place, and time.   Psychiatric:         Mood and Affect: Mood normal.         Behavior: Behavior normal.         Thought Content: Thought content normal.         Judgment: Judgment normal.         MRI upper extremity: General Report and images (with respect to the venous abnormality) reviewed.      Administrative " Statements   I have spent a total time of 45 minutes in caring for this patient on the day of the visit/encounter including Diagnostic results, Prognosis, Risks and benefits of tx options, Instructions for management, Patient and family education, Importance of tx compliance, Risk factor reductions, Impressions, Counseling / Coordination of care, Documenting in the medical record, Reviewing/placing orders in the medical record (including tests, medications, and/or procedures), and Obtaining or reviewing history  .